# Patient Record
Sex: MALE | HISPANIC OR LATINO | URBAN - METROPOLITAN AREA
[De-identification: names, ages, dates, MRNs, and addresses within clinical notes are randomized per-mention and may not be internally consistent; named-entity substitution may affect disease eponyms.]

---
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
## 2019-02-22 NOTE — ED STATDOCS - NS_ATTENDINGSCRIBE_ED_ALL_ED
n/a
I personally performed the service described in the documentation recorded by the scribe in my presence, and it accurately and completely records my words and actions.

## 2024-05-01 NOTE — ED ADULT TRIAGE NOTE - HEIGHT IN INCHES
Initial /CM Assessment/Plan of Care Note     Baseline Assessment  89 year old year old admitted 9/6/2017 as Inpatient with a diagnosis of fall at her group home residence, traumatic intraventricular hemorrhage, posterior scalp laceration, hypertension, and advanced dementia.  Transferred here from the Emergency Department at [facility].  Seen by Trauma Surgery consultation.  PT/OT/Speech Therapy evaluations also completed.  Prior to admission, patient was living with other residents at [facility]/group home residence.  Patient has a living will document copy on file.  Her Power of  for Healthcare document is no longer in effect as the agent designees were her sisters (Cara Reed and Xin Velazco) who are also quite elderly in age and no longer able to serve in this role capacity.  Patient has a Legal Guardian of the person and estate appointed.  Designated guardian is [facility] (:  Kirstin Forrest Phone:  [phone]). Patient’s Primary Care Provider is Dr Vazquez with [facility].     Medical History  Past Medical History:   Diagnosis Date   • Alzheimer's disease    • Depression    • Diabetes mellitus (CMS/[facility])    • Essential (primary) hypertension    • Osteoporosis, unspecified 10/26/2009   • Other and unspecified hyperlipidemia    • Thyroid condition    • Urinary incontinence        Prior to Admission Status  Functional Status  Ambulation: Independent/Self    Agency/Support  Type of Services Prior to Hospitalization: , Community agency/program  Support Systems: /  Home Devices/Equipment: None  Mobility Assist Devices: Wheelchair  Sensory Support Devices: None    Current Status  PT Ambulation Tips: Needs minimal assist  PT Transfer Tips: Needs minimal assist  OT Bathing Tips:    OT Dressing Tips:    OT Toileting Tips:    OT Feeding Tips:    SLP Swallow/Feeding Tips: See posted  feeding/swallowing guidlines, Needs constant supervision, Needs assist with feeding  SLP Comm/Cog Tips:    Current Mental Status:    Stressors:      Insurance  Primary: MEDICARE  Secondary: AARP    Barriers to Discharge  Identified Barriers to Discharge/Transition Planning:      Progress Note  Consult received for discharge planning involvement.  Nursing trigger also put through in error for nursing home return.  Pt actually resides at a [facility]/group home residence ([facility] in [place]).  Address is:  [address] [place] [address].  Main phone number is:  [phone].  Fax number is:  [phone].  Spoke with the manager (Chelsey Florentino Cell:  [phone]) there today and confirmed that pt can return to residence upon discharge.  Able to transfer and ambulate without use of an assistive device at baseline.  Usually is steady on her feet and fall that had is out of the ordinary.  Staff provide assistance with self cares (phyical assistance provision for bathing and toileting, set up/supervision for dressing feeding,and grooming tasks).  Also handle medication management with use of [facility] (Fax:  [phone]) and household task completion (meal preparation, cleaning, laundry).  Would not be able to provide transportation back to the residence upon discharge.    Message left for pt's guardian  to further discuss means of transport for discharge tomorrow.  [facility] [employer] wheelchair van ride would cost $175.  Await return call reception.      Plan  SW/CM - Recommendations for Discharge: [facility]  PT - Recommendations for Discharge: SNF  OT - Recommendations for Discharge: Home (home is Dementia care home)  SLP - Recommendations for Discharge: return to facility  Anticipate patient will need post-hospital services. Necessary services are available.  Anticipate patient can return to the environment from which patient entered the hospital.   Anticipate patient can provide  self-care at discharge with staff assistance provision.    Refer to SW/CM Flowsheet for Goals and objective data.      11 [As Noted in HPI] : as noted in HPI [Negative] : Heme/Lymph